# Patient Record
Sex: FEMALE | Race: WHITE | NOT HISPANIC OR LATINO | Employment: FULL TIME | URBAN - METROPOLITAN AREA
[De-identification: names, ages, dates, MRNs, and addresses within clinical notes are randomized per-mention and may not be internally consistent; named-entity substitution may affect disease eponyms.]

---

## 2017-02-22 ENCOUNTER — TRANSCRIBE ORDERS (OUTPATIENT)
Dept: INTERNAL MEDICINE | Facility: CLINIC | Age: 67
End: 2017-02-22

## 2017-02-22 DIAGNOSIS — Z13.9 SCREENING: Primary | ICD-10-CM

## 2017-03-01 ENCOUNTER — HOSPITAL ENCOUNTER (OUTPATIENT)
Dept: MAMMOGRAPHY | Facility: HOSPITAL | Age: 67
Discharge: HOME | End: 2017-03-01
Admitting: INTERNAL MEDICINE

## 2017-03-01 DIAGNOSIS — Z13.9 SCREENING: ICD-10-CM

## 2017-03-01 PROCEDURE — 77063 BREAST TOMOSYNTHESIS BI: CPT

## 2017-03-01 PROCEDURE — G0202 SCR MAMMO BI INCL CAD: HCPCS

## 2017-03-02 ENCOUNTER — TELEPHONE (OUTPATIENT)
Dept: INTERNAL MEDICINE | Facility: CLINIC | Age: 67
End: 2017-03-02

## 2017-04-03 DIAGNOSIS — M81.0 OSTEOPOROSIS: ICD-10-CM

## 2017-04-03 RX ORDER — ALENDRONATE SODIUM 70 MG/1
TABLET ORAL
Qty: 4 TABLET | Refills: 11 | Status: SHIPPED | OUTPATIENT
Start: 2017-04-03 | End: 2017-06-21 | Stop reason: SDUPTHER

## 2017-06-21 ENCOUNTER — OFFICE VISIT (OUTPATIENT)
Dept: INTERNAL MEDICINE | Facility: CLINIC | Age: 67
End: 2017-06-21

## 2017-06-21 VITALS
RESPIRATION RATE: 18 BRPM | BODY MASS INDEX: 23.32 KG/M2 | SYSTOLIC BLOOD PRESSURE: 110 MMHG | DIASTOLIC BLOOD PRESSURE: 64 MMHG | WEIGHT: 140 LBS | HEART RATE: 78 BPM | OXYGEN SATURATION: 98 % | HEIGHT: 65 IN

## 2017-06-21 DIAGNOSIS — M81.0 OSTEOPOROSIS: ICD-10-CM

## 2017-06-21 DIAGNOSIS — D72.9 WHITE BLOOD CELL ABNORMALITY: Primary | ICD-10-CM

## 2017-06-21 DIAGNOSIS — E55.9 VITAMIN D DEFICIENCY: ICD-10-CM

## 2017-06-21 LAB
25(OH)D3+25(OH)D2 SERPL-MCNC: 42.5 NG/ML
BASOPHILS # BLD AUTO: 0.03 10*3/MM3 (ref 0–0.2)
BASOPHILS NFR BLD AUTO: 0.6 % (ref 0–2)
BUN SERPL-MCNC: 19 MG/DL (ref 8–23)
BUN/CREAT SERPL: 27.9 (ref 7–25)
CALCIUM SERPL-MCNC: 9.4 MG/DL (ref 8.8–10.5)
CHLORIDE SERPL-SCNC: 101 MMOL/L (ref 98–107)
CO2 SERPL-SCNC: 28.8 MMOL/L (ref 22–29)
CREAT SERPL-MCNC: 0.68 MG/DL (ref 0.57–1)
EOSINOPHIL # BLD AUTO: 0.1 10*3/MM3 (ref 0.1–0.3)
EOSINOPHIL NFR BLD AUTO: 2 % (ref 0–4)
ERYTHROCYTE [DISTWIDTH] IN BLOOD BY AUTOMATED COUNT: 12.6 % (ref 11.5–14.5)
GLUCOSE SERPL-MCNC: 92 MG/DL (ref 65–99)
HCT VFR BLD AUTO: 37.7 % (ref 37–47)
HGB BLD-MCNC: 12.6 G/DL (ref 12–16)
IMM GRANULOCYTES # BLD: 0 10*3/MM3 (ref 0–0.03)
IMM GRANULOCYTES NFR BLD: 0 % (ref 0–0.5)
LYMPHOCYTES # BLD AUTO: 1.6 10*3/MM3 (ref 0.6–4.8)
LYMPHOCYTES NFR BLD AUTO: 31.4 % (ref 20–45)
MCH RBC QN AUTO: 31.1 PG (ref 27–31)
MCHC RBC AUTO-ENTMCNC: 33.4 G/DL (ref 31–37)
MCV RBC AUTO: 93.1 FL (ref 81–99)
MONOCYTES # BLD AUTO: 0.73 10*3/MM3 (ref 0–1)
MONOCYTES NFR BLD AUTO: 14.3 % (ref 3–8)
NEUTROPHILS # BLD AUTO: 2.64 10*3/MM3 (ref 1.5–8.3)
NEUTROPHILS NFR BLD AUTO: 51.7 % (ref 45–70)
NRBC BLD AUTO-RTO: 0 /100 WBC (ref 0–0)
PLATELET # BLD AUTO: 304 10*3/MM3 (ref 140–500)
POTASSIUM SERPL-SCNC: 4.3 MMOL/L (ref 3.5–5.2)
RBC # BLD AUTO: 4.05 10*6/MM3 (ref 4.2–5.4)
SODIUM SERPL-SCNC: 142 MMOL/L (ref 136–145)
WBC # BLD AUTO: 5.1 10*3/MM3 (ref 4.8–10.8)

## 2017-06-21 PROCEDURE — 99214 OFFICE O/P EST MOD 30 MIN: CPT | Performed by: INTERNAL MEDICINE

## 2017-06-21 RX ORDER — ALENDRONATE SODIUM 70 MG/1
70 TABLET ORAL
Qty: 12 TABLET | Refills: 3 | Status: SHIPPED | OUTPATIENT
Start: 2017-06-21 | End: 2018-06-08 | Stop reason: SDUPTHER

## 2017-06-21 RX ORDER — VITAMIN B COMPLEX
CAPSULE ORAL DAILY
COMMUNITY

## 2017-06-21 RX ORDER — MULTIVIT-MIN/IRON/FOLIC ACID/K 18-600-40
CAPSULE ORAL
COMMUNITY
End: 2019-09-18

## 2017-06-21 NOTE — PATIENT INSTRUCTIONS
Assessment/Plan   Isabel was seen today for osteoporosis.    Diagnoses and all orders for this visit:    White blood cell abnormality  -     CBC w AUTO Differential    Osteoporosis  -     alendronate (FOSAMAX) 70 MG tablet; Take 1 tablet by mouth Every 7 (Seven) Days.  -     Basic metabolic panel    Vitamin D deficiency  -     Vitamin D 25 hydroxy        Recheck DEXA in 2018, consider prolia or reclast at that time, but tolerating her fosamax well.

## 2017-07-11 ENCOUNTER — TELEPHONE (OUTPATIENT)
Dept: INTERNAL MEDICINE | Facility: CLINIC | Age: 67
End: 2017-07-11

## 2017-07-11 NOTE — TELEPHONE ENCOUNTER
Left message with results on patients voicemail.   ----- Message from Cuauhtemoc Carrasquillo MD sent at 7/11/2017  8:52 AM EDT -----  Labs are really good including vitamin D at 41. No changes based on labs.

## 2017-07-12 ENCOUNTER — TELEPHONE (OUTPATIENT)
Dept: INTERNAL MEDICINE | Facility: CLINIC | Age: 67
End: 2017-07-12

## 2017-07-12 NOTE — TELEPHONE ENCOUNTER
LVM with results on personal voicemail. Advised patient to call office with any questions or concerns.     ----- Message from Cuauhtemoc Carrasquillo MD sent at 7/11/2017  8:52 AM EDT -----  Labs are really good including vitamin D at 41. No changes based on labs.

## 2018-01-11 ENCOUNTER — LAB (OUTPATIENT)
Dept: INTERNAL MEDICINE | Facility: CLINIC | Age: 68
End: 2018-01-11

## 2018-01-11 DIAGNOSIS — D72.9 WHITE BLOOD CELL ABNORMALITY: ICD-10-CM

## 2018-01-11 DIAGNOSIS — E55.9 VITAMIN D DEFICIENCY: Primary | ICD-10-CM

## 2018-01-11 DIAGNOSIS — M81.0 OSTEOPOROSIS, UNSPECIFIED OSTEOPOROSIS TYPE, UNSPECIFIED PATHOLOGICAL FRACTURE PRESENCE: ICD-10-CM

## 2018-01-11 DIAGNOSIS — Z00.00 HEALTHCARE MAINTENANCE: ICD-10-CM

## 2018-01-11 LAB
25(OH)D3+25(OH)D2 SERPL-MCNC: 56.4 NG/ML
ALBUMIN SERPL-MCNC: 4.4 G/DL (ref 3.5–5.2)
ALBUMIN/GLOB SERPL: 2.2 G/DL
ALP SERPL-CCNC: 54 U/L (ref 40–129)
ALT SERPL-CCNC: 16 U/L (ref 5–33)
AST SERPL-CCNC: 21 U/L (ref 5–32)
BASOPHILS # BLD AUTO: 0.04 10*3/MM3 (ref 0–0.2)
BASOPHILS NFR BLD AUTO: 1.1 % (ref 0–2)
BILIRUB SERPL-MCNC: 0.4 MG/DL (ref 0.2–1.2)
BUN SERPL-MCNC: 14 MG/DL (ref 8–23)
BUN/CREAT SERPL: 17.7 (ref 7–25)
CALCIUM SERPL-MCNC: 9.4 MG/DL (ref 8.8–10.5)
CHLORIDE SERPL-SCNC: 101 MMOL/L (ref 98–107)
CHOLEST SERPL-MCNC: 230 MG/DL (ref 0–200)
CHOLEST/HDLC SERPL: 2.64 {RATIO}
CO2 SERPL-SCNC: 30.3 MMOL/L (ref 22–29)
CREAT SERPL-MCNC: 0.79 MG/DL (ref 0.57–1)
EOSINOPHIL # BLD AUTO: 0.08 10*3/MM3 (ref 0.1–0.3)
EOSINOPHIL NFR BLD AUTO: 2.2 % (ref 0–4)
ERYTHROCYTE [DISTWIDTH] IN BLOOD BY AUTOMATED COUNT: 12.4 % (ref 11.5–14.5)
GLOBULIN SER CALC-MCNC: 2 GM/DL
GLUCOSE SERPL-MCNC: 86 MG/DL (ref 65–99)
HBA1C MFR BLD: 5.3 % (ref 4.8–5.6)
HCT VFR BLD AUTO: 36.8 % (ref 37–47)
HDLC SERPL-MCNC: 87 MG/DL (ref 40–60)
HGB BLD-MCNC: 12.2 G/DL (ref 12–16)
IMM GRANULOCYTES # BLD: 0 10*3/MM3 (ref 0–0.03)
IMM GRANULOCYTES NFR BLD: 0 % (ref 0–0.5)
LDLC SERPL CALC-MCNC: 131 MG/DL (ref 0–100)
LYMPHOCYTES # BLD AUTO: 1.13 10*3/MM3 (ref 0.6–4.8)
LYMPHOCYTES NFR BLD AUTO: 31.4 % (ref 20–45)
MCH RBC QN AUTO: 31.7 PG (ref 27–31)
MCHC RBC AUTO-ENTMCNC: 33.2 G/DL (ref 31–37)
MCV RBC AUTO: 95.6 FL (ref 81–99)
MONOCYTES # BLD AUTO: 0.58 10*3/MM3 (ref 0–1)
MONOCYTES NFR BLD AUTO: 16.1 % (ref 3–8)
NEUTROPHILS # BLD AUTO: 1.77 10*3/MM3 (ref 1.5–8.3)
NEUTROPHILS NFR BLD AUTO: 49.2 % (ref 45–70)
NRBC BLD AUTO-RTO: 0 /100 WBC (ref 0–0)
PLATELET # BLD AUTO: 288 10*3/MM3 (ref 140–500)
POTASSIUM SERPL-SCNC: 4.1 MMOL/L (ref 3.5–5.2)
PROT SERPL-MCNC: 6.4 G/DL (ref 6–8.5)
RBC # BLD AUTO: 3.85 10*6/MM3 (ref 4.2–5.4)
SODIUM SERPL-SCNC: 141 MMOL/L (ref 136–145)
TRIGL SERPL-MCNC: 60 MG/DL (ref 0–150)
TSH SERPL DL<=0.005 MIU/L-ACNC: 2.97 MIU/ML (ref 0.27–4.2)
VLDLC SERPL CALC-MCNC: 12 MG/DL (ref 7–27)
WBC # BLD AUTO: 3.6 10*3/MM3 (ref 4.8–10.8)

## 2018-02-16 ENCOUNTER — TRANSCRIBE ORDERS (OUTPATIENT)
Dept: ADMINISTRATIVE | Facility: HOSPITAL | Age: 68
End: 2018-02-16

## 2018-02-16 ENCOUNTER — OFFICE VISIT (OUTPATIENT)
Dept: INTERNAL MEDICINE | Facility: CLINIC | Age: 68
End: 2018-02-16

## 2018-02-16 VITALS
DIASTOLIC BLOOD PRESSURE: 68 MMHG | RESPIRATION RATE: 16 BRPM | SYSTOLIC BLOOD PRESSURE: 112 MMHG | WEIGHT: 152 LBS | OXYGEN SATURATION: 98 % | HEART RATE: 76 BPM | BODY MASS INDEX: 25.29 KG/M2

## 2018-02-16 DIAGNOSIS — Z00.00 ROUTINE HEALTH MAINTENANCE: Primary | ICD-10-CM

## 2018-02-16 DIAGNOSIS — Z12.39 SCREENING BREAST EXAMINATION: Primary | ICD-10-CM

## 2018-02-16 PROCEDURE — 90732 PPSV23 VACC 2 YRS+ SUBQ/IM: CPT | Performed by: INTERNAL MEDICINE

## 2018-02-16 PROCEDURE — 93000 ELECTROCARDIOGRAM COMPLETE: CPT | Performed by: INTERNAL MEDICINE

## 2018-02-16 PROCEDURE — 99397 PER PM REEVAL EST PAT 65+ YR: CPT | Performed by: INTERNAL MEDICINE

## 2018-02-16 PROCEDURE — G0009 ADMIN PNEUMOCOCCAL VACCINE: HCPCS | Performed by: INTERNAL MEDICINE

## 2018-02-16 NOTE — PATIENT INSTRUCTIONS
Assessment/Plan   Isabel was seen today for annual exam.    Diagnoses and all orders for this visit:    Routine health maintenance  -     ECG 12 Lead      Recommend glenys and epley  Discussed start claritin D - 10 mg po daily    Knee chondromalacia, arthritis likely starting  Consider motrin if pain starts  Start quad strengthening     Consider fish oil.   Joint juice    Stop vitamin D  Continue caltrate      Return in about 6 months (around 8/16/2018).    Cuauhtemoc Carrasquillo MD  02/16/2018

## 2018-02-16 NOTE — PROGRESS NOTES
Subjective     Isabel Pizarro is a 67 y.o. female, who presents with a chief complaint of   Chief Complaint   Patient presents with   • Annual Exam       HPI       66 yo female with pmhx osteoporosis.  Tolerating fosamax religioiusly,  Without reflux.       Fighting cold for 2 weeks, having mild positional vertigo.  It is scant and positional.  No falls or ataxia.  She is feeling much better    She does know she has gained weight.         The following portions of the patient's history were reviewed and updated as appropriate: allergies, current medications, past family history, past medical history, past social history, past surgical history and problem list.    Allergies: Review of patient's allergies indicates no known allergies.    Current Outpatient Prescriptions:   •  alendronate (FOSAMAX) 70 MG tablet, Take 1 tablet by mouth Every 7 (Seven) Days., Disp: 12 tablet, Rfl: 3  •  B Complex Vitamins (VITAMIN B COMPLEX) capsule capsule, Take  by mouth Daily., Disp: , Rfl:   •  Cholecalciferol (VITAMIN D) 2000 UNITS capsule, Take  by mouth., Disp: , Rfl:   •  famciclovir (FAMVIR) 500 MG tablet, Take 1 tablet by mouth 3 (Three) Times a Day., Disp: 30 tablet, Rfl: 0  There are no discontinued medications.    Review of Systems   Constitutional: Negative.  Negative for appetite change, fatigue, fever and unexpected weight change.   HENT: Negative.  Negative for sinus pressure and trouble swallowing.    Eyes: Negative.    Respiratory: Negative.  Negative for cough and chest tightness.    Cardiovascular: Negative for chest pain, palpitations and leg swelling.   Gastrointestinal: Negative for constipation and diarrhea.   Genitourinary: Negative.  Negative for dysuria, frequency, hematuria, pelvic pain and vaginal discharge.   Musculoskeletal: Negative.    Skin: Negative.    Allergic/Immunologic: Negative.    Neurological: Negative.  Negative for dizziness, light-headedness and headaches.   Hematological: Negative.     Psychiatric/Behavioral: Negative.    All other systems reviewed and are negative.      Objective     /68  Pulse 76  Resp 16  Wt 68.9 kg (152 lb)  SpO2 98%  BMI 25.29 kg/m2      Physical Exam   Constitutional: She is oriented to person, place, and time. She appears well-developed and well-nourished.   HENT:   Head: Normocephalic and atraumatic.   Right Ear: External ear normal.   Left Ear: External ear normal.   Eyes: EOM are normal. Pupils are equal, round, and reactive to light.   Neck: Normal range of motion. Neck supple. No JVD present. No tracheal deviation present. No thyromegaly present.   Cardiovascular: Normal rate, regular rhythm and normal heart sounds.  Exam reveals no friction rub.    No murmur heard.  Pulmonary/Chest: Effort normal and breath sounds normal. No respiratory distress. She exhibits no mass. Right breast exhibits no inverted nipple, no mass, no nipple discharge, no skin change and no tenderness. Left breast exhibits no inverted nipple, no mass, no nipple discharge, no skin change and no tenderness. There is no breast swelling.   Lymphadenopathy:     She has no cervical adenopathy.   Neurological: She is alert and oriented to person, place, and time.   Skin: Skin is warm and dry.   Psychiatric: She has a normal mood and affect. Her behavior is normal.   Vitals reviewed.      Lab Results (most recent)     None          Results for orders placed or performed in visit on 01/11/18   Comprehensive Metabolic Panel   Result Value Ref Range    Glucose 86 65 - 99 mg/dL    BUN 14 8 - 23 mg/dL    Creatinine 0.79 0.57 - 1.00 mg/dL    eGFR Non African Am 73 >60 mL/min/1.73    eGFR African Am 88 >60 mL/min/1.73    BUN/Creatinine Ratio 17.7 7.0 - 25.0    Sodium 141 136 - 145 mmol/L    Potassium 4.1 3.5 - 5.2 mmol/L    Chloride 101 98 - 107 mmol/L    Total CO2 30.3 (H) 22.0 - 29.0 mmol/L    Calcium 9.4 8.8 - 10.5 mg/dL    Total Protein 6.4 6.0 - 8.5 g/dL    Albumin 4.40 3.50 - 5.20 g/dL     Globulin 2.0 gm/dL    A/G Ratio 2.2 g/dL    Total Bilirubin 0.4 0.2 - 1.2 mg/dL    Alkaline Phosphatase 54 40 - 129 U/L    AST (SGOT) 21 5 - 32 U/L    ALT (SGPT) 16 5 - 33 U/L   Lipid Panel With / Chol / HDL Ratio   Result Value Ref Range    Total Cholesterol 230 (H) 0 - 200 mg/dL    Triglycerides 60 0 - 150 mg/dL    HDL Cholesterol 87 (H) 40 - 60 mg/dL    VLDL Cholesterol 12 7 - 27 mg/dL    LDL Cholesterol  131 (H) 0 - 100 mg/dL    Chol/HDL Ratio 2.64    TSH   Result Value Ref Range    TSH 2.970 0.270 - 4.200 mIU/mL   Hemoglobin A1c   Result Value Ref Range    Hemoglobin A1C 5.30 4.80 - 5.60 %   Vitamin D 25 Hydroxy   Result Value Ref Range    25 Hydroxy, Vitamin D 56.4 ng/mL   CBC & Differential   Result Value Ref Range    WBC 3.60 (L) 4.80 - 10.80 10*3/mm3    RBC 3.85 (L) 4.20 - 5.40 10*6/mm3    Hemoglobin 12.2 12.0 - 16.0 g/dL    Hematocrit 36.8 (L) 37.0 - 47.0 %    MCV 95.6 81.0 - 99.0 fL    MCH 31.7 (H) 27.0 - 31.0 pg    MCHC 33.2 31.0 - 37.0 g/dL    RDW 12.4 11.5 - 14.5 %    Platelets 288 140 - 500 10*3/mm3    Neutrophil Rel % 49.2 45.0 - 70.0 %    Lymphocyte Rel % 31.4 20.0 - 45.0 %    Monocyte Rel % 16.1 (H) 3.0 - 8.0 %    Eosinophil Rel % 2.2 0.0 - 4.0 %    Basophil Rel % 1.1 0.0 - 2.0 %    Neutrophils Absolute 1.77 1.50 - 8.30 10*3/mm3    Lymphocytes Absolute 1.13 0.60 - 4.80 10*3/mm3    Monocytes Absolute 0.58 0.00 - 1.00 10*3/mm3    Eosinophils Absolute 0.08 (L) 0.10 - 0.30 10*3/mm3    Basophils Absolute 0.04 0.00 - 0.20 10*3/mm3    Immature Granulocyte Rel % 0.0 0.0 - 0.5 %    Immature Grans Absolute 0.00 0.00 - 0.03 10*3/mm3    nRBC 0.0 0.0 - 0.0 /100 WBC     Mammogram neg 3/16  DEXA 10/16 - osteoporosis      ECG 12 Lead  Date/Time: 2/16/2018 9:21 AM  Performed by: KATHE SALDANA  Authorized by: KATHE SALDANA   Comparison: not compared with previous ECG   Rhythm: sinus rhythm  Rate: normal  ST Segments: ST segments normal  T Waves: T waves normal  QRS axis: normal  Clinical impression: normal  ECG  Comments: EKG unchanged, compared with 12/2016                Assessment/Plan   Isabel was seen today for annual exam.    Diagnoses and all orders for this visit:    Routine health maintenance  -     ECG 12 Lead      Recommend glenys and epley  Discussed start claritin D - 10 mg po daily    Knee chondromalacia, arthritis likely starting  Consider motrin if pain starts  Start quad strengthening     Consider fish oil.   Joint juice    Stop vitamin D  Continue caltrate      Return in about 6 months (around 8/16/2018).    Cuauhtemoc Carrasquillo MD  02/16/2018

## 2018-03-05 ENCOUNTER — APPOINTMENT (OUTPATIENT)
Dept: MAMMOGRAPHY | Facility: HOSPITAL | Age: 68
End: 2018-03-05
Attending: INTERNAL MEDICINE

## 2018-03-06 ENCOUNTER — APPOINTMENT (OUTPATIENT)
Dept: MAMMOGRAPHY | Facility: HOSPITAL | Age: 68
End: 2018-03-06
Attending: INTERNAL MEDICINE

## 2018-03-09 ENCOUNTER — HOSPITAL ENCOUNTER (OUTPATIENT)
Dept: MAMMOGRAPHY | Facility: HOSPITAL | Age: 68
Discharge: HOME | End: 2018-03-09
Attending: INTERNAL MEDICINE | Admitting: INTERNAL MEDICINE

## 2018-03-09 DIAGNOSIS — Z12.39 SCREENING BREAST EXAMINATION: ICD-10-CM

## 2018-03-09 PROCEDURE — 77063 BREAST TOMOSYNTHESIS BI: CPT

## 2018-03-09 PROCEDURE — 77067 SCR MAMMO BI INCL CAD: CPT

## 2018-03-13 ENCOUNTER — TELEPHONE (OUTPATIENT)
Dept: INTERNAL MEDICINE | Facility: CLINIC | Age: 68
End: 2018-03-13

## 2018-03-13 NOTE — TELEPHONE ENCOUNTER
LVM with results on personal mailbox. Advised patient to return call with any questions or concerns.     ----- Message from Cuauhtemoc Carrasquillo MD sent at 3/13/2018  9:54 AM EDT -----  Normal mammogram

## 2018-06-08 DIAGNOSIS — M81.0 OSTEOPOROSIS: ICD-10-CM

## 2018-06-11 RX ORDER — ALENDRONATE SODIUM 70 MG/1
TABLET ORAL
Qty: 12 TABLET | Refills: 2 | Status: SHIPPED | OUTPATIENT
Start: 2018-06-11 | End: 2019-02-21 | Stop reason: SDUPTHER

## 2018-08-24 ENCOUNTER — OFFICE VISIT (OUTPATIENT)
Dept: INTERNAL MEDICINE | Facility: CLINIC | Age: 68
End: 2018-08-24

## 2018-08-24 VITALS
HEART RATE: 64 BPM | RESPIRATION RATE: 16 BRPM | DIASTOLIC BLOOD PRESSURE: 64 MMHG | WEIGHT: 145.4 LBS | OXYGEN SATURATION: 98 % | SYSTOLIC BLOOD PRESSURE: 120 MMHG | HEIGHT: 65 IN | TEMPERATURE: 97.8 F | BODY MASS INDEX: 24.22 KG/M2

## 2018-08-24 DIAGNOSIS — R31.21 ASYMPTOMATIC MICROSCOPIC HEMATURIA: ICD-10-CM

## 2018-08-24 DIAGNOSIS — R31.9 HEMATURIA, UNSPECIFIED TYPE: ICD-10-CM

## 2018-08-24 DIAGNOSIS — M81.0 OSTEOPOROSIS, UNSPECIFIED OSTEOPOROSIS TYPE, UNSPECIFIED PATHOLOGICAL FRACTURE PRESENCE: Primary | ICD-10-CM

## 2018-08-24 DIAGNOSIS — K63.5 HYPERPLASTIC COLONIC POLYP, UNSPECIFIED PART OF COLON: ICD-10-CM

## 2018-08-24 LAB
APPEARANCE UR: CLEAR
BACTERIA #/AREA URNS HPF: ABNORMAL /HPF
BILIRUB BLD-MCNC: NEGATIVE MG/DL
BILIRUB UR QL STRIP: NEGATIVE
CLARITY, POC: CLEAR
COLOR UR: (no result)
COLOR UR: YELLOW
EPI CELLS #/AREA URNS HPF: ABNORMAL /HPF
GLUCOSE UR QL: NEGATIVE
GLUCOSE UR STRIP-MCNC: NEGATIVE MG/DL
HGB UR QL STRIP: (no result)
KETONES UR QL STRIP: NEGATIVE
KETONES UR QL: NEGATIVE
LEUKOCYTE EST, POC: NEGATIVE
LEUKOCYTE ESTERASE UR QL STRIP: NEGATIVE
NITRITE UR QL STRIP: NEGATIVE
NITRITE UR-MCNC: NEGATIVE MG/ML
PH UR STRIP: 7.5 [PH] (ref 4.5–8)
PH UR: 7.5 [PH] (ref 5–8)
PROT UR QL STRIP: NEGATIVE
PROT UR STRIP-MCNC: NEGATIVE MG/DL
RBC # UR STRIP: ABNORMAL /UL
RBC #/AREA URNS HPF: ABNORMAL /HPF
SP GR UR: 1.01 (ref 1–1.03)
SP GR UR: 1.01 (ref 1–1.03)
UROBILINOGEN UR QL: NORMAL
UROBILINOGEN UR STRIP-MCNC: (no result) MG/DL
WBC #/AREA URNS HPF: ABNORMAL /HPF

## 2018-08-24 PROCEDURE — 81002 URINALYSIS NONAUTO W/O SCOPE: CPT | Performed by: INTERNAL MEDICINE

## 2018-08-24 PROCEDURE — 99214 OFFICE O/P EST MOD 30 MIN: CPT | Performed by: INTERNAL MEDICINE

## 2018-08-24 RX ORDER — CHLORAL HYDRATE 500 MG
CAPSULE ORAL
COMMUNITY

## 2018-08-24 NOTE — PATIENT INSTRUCTIONS
Isabel was seen today for osteoporosis.    Diagnoses and all orders for this visit:    Osteoporosis, unspecified osteoporosis type, unspecified pathological fracture presence    Asymptomatic microscopic hematuria    Hyperplastic colonic polyp, unspecified part of colon  -     Ambulatory Referral to Gastroenterology      Mammogram due after 3/9/18  DExA due  Consider prolia if T score decreased  Prior polyps.  Intermittent hematuria.  Topical itraconazole - compound care   Take B complex - hair skin and nails -

## 2018-08-24 NOTE — PROGRESS NOTES
"      Isabel Pizarro is a 68 y.o. female, who presents with a chief complaint of   Chief Complaint   Patient presents with   • Osteoporosis       HPI   69 yo female with osteoporosis doing great on fosamax with no ASAEL symptoms.  She reports no new concerns. Had history of hematuria, none recently, but due for check. She also has bilateral thickened nails on first and second toes.  Really not interested in oral therapy with hepatic risk.     The following portions of the patient's history were reviewed and updated as appropriate: allergies, current medications, past family history, past medical history, past social history, past surgical history and problem list.    Allergies: Patient has no known allergies.    Current Outpatient Prescriptions:   •  Omega-3 Fatty Acids (FISH OIL) 1000 MG capsule capsule, Take  by mouth Daily With Breakfast., Disp: , Rfl:   •  alendronate (FOSAMAX) 70 MG tablet, TAKE 1 TABLET BY MOUTH ONCE WEEKLY BEFORE BREAKFAST, ON AN EMPTY STOMACH: REMAIN UPRIGHT FOR 30 MINUTES:TAKE WITH 8 OUNCES OF WATER, Disp: 12 tablet, Rfl: 2  •  B Complex Vitamins (VITAMIN B COMPLEX) capsule capsule, Take  by mouth Daily., Disp: , Rfl:   •  Cholecalciferol (VITAMIN D) 2000 UNITS capsule, Take  by mouth., Disp: , Rfl:   Medications Discontinued During This Encounter   Medication Reason   • famciclovir (FAMVIR) 500 MG tablet *Therapy completed       Review of Systems   Constitutional: Negative.    HENT: Negative.    Respiratory: Negative.    Cardiovascular: Negative.    Endocrine:        As in hpi   Genitourinary: Negative.    Neurological: Negative.    Hematological: Negative.    Psychiatric/Behavioral: Negative.              /64   Pulse 64   Temp 97.8 °F (36.6 °C)   Resp 16   Ht 165.1 cm (65\")   Wt 66 kg (145 lb 6.4 oz)   SpO2 98%   BMI 24.20 kg/m²       Physical Exam   Constitutional: She is oriented to person, place, and time. She appears well-developed and well-nourished.   HENT:   Head: " Normocephalic and atraumatic.   Right Ear: External ear normal.   Left Ear: External ear normal.   Eyes: Pupils are equal, round, and reactive to light. EOM are normal.   Neck: Normal range of motion. Neck supple. No thyromegaly present.   Cardiovascular: Normal rate, regular rhythm and normal heart sounds.    No murmur heard.  Pulmonary/Chest: Effort normal and breath sounds normal.   Musculoskeletal: She exhibits no edema.   Neurological: She is alert and oriented to person, place, and time.   Skin: Skin is warm and dry. Capillary refill takes less than 2 seconds.   Psychiatric: She has a normal mood and affect. Her behavior is normal.   Vitals reviewed.      Lab Results (most recent)     None          Results for orders placed or performed in visit on 01/11/18   Comprehensive Metabolic Panel   Result Value Ref Range    Glucose 86 65 - 99 mg/dL    BUN 14 8 - 23 mg/dL    Creatinine 0.79 0.57 - 1.00 mg/dL    eGFR Non African Am 73 >60 mL/min/1.73    eGFR African Am 88 >60 mL/min/1.73    BUN/Creatinine Ratio 17.7 7.0 - 25.0    Sodium 141 136 - 145 mmol/L    Potassium 4.1 3.5 - 5.2 mmol/L    Chloride 101 98 - 107 mmol/L    Total CO2 30.3 (H) 22.0 - 29.0 mmol/L    Calcium 9.4 8.8 - 10.5 mg/dL    Total Protein 6.4 6.0 - 8.5 g/dL    Albumin 4.40 3.50 - 5.20 g/dL    Globulin 2.0 gm/dL    A/G Ratio 2.2 g/dL    Total Bilirubin 0.4 0.2 - 1.2 mg/dL    Alkaline Phosphatase 54 40 - 129 U/L    AST (SGOT) 21 5 - 32 U/L    ALT (SGPT) 16 5 - 33 U/L   Lipid Panel With / Chol / HDL Ratio   Result Value Ref Range    Total Cholesterol 230 (H) 0 - 200 mg/dL    Triglycerides 60 0 - 150 mg/dL    HDL Cholesterol 87 (H) 40 - 60 mg/dL    VLDL Cholesterol 12 7 - 27 mg/dL    LDL Cholesterol  131 (H) 0 - 100 mg/dL    Chol/HDL Ratio 2.64    TSH   Result Value Ref Range    TSH 2.970 0.270 - 4.200 mIU/mL   Hemoglobin A1c   Result Value Ref Range    Hemoglobin A1C 5.30 4.80 - 5.60 %   Vitamin D 25 Hydroxy   Result Value Ref Range    25 Hydroxy,  Vitamin D 56.4 ng/mL   CBC & Differential   Result Value Ref Range    WBC 3.60 (L) 4.80 - 10.80 10*3/mm3    RBC 3.85 (L) 4.20 - 5.40 10*6/mm3    Hemoglobin 12.2 12.0 - 16.0 g/dL    Hematocrit 36.8 (L) 37.0 - 47.0 %    MCV 95.6 81.0 - 99.0 fL    MCH 31.7 (H) 27.0 - 31.0 pg    MCHC 33.2 31.0 - 37.0 g/dL    RDW 12.4 11.5 - 14.5 %    Platelets 288 140 - 500 10*3/mm3    Neutrophil Rel % 49.2 45.0 - 70.0 %    Lymphocyte Rel % 31.4 20.0 - 45.0 %    Monocyte Rel % 16.1 (H) 3.0 - 8.0 %    Eosinophil Rel % 2.2 0.0 - 4.0 %    Basophil Rel % 1.1 0.0 - 2.0 %    Neutrophils Absolute 1.77 1.50 - 8.30 10*3/mm3    Lymphocytes Absolute 1.13 0.60 - 4.80 10*3/mm3    Monocytes Absolute 0.58 0.00 - 1.00 10*3/mm3    Eosinophils Absolute 0.08 (L) 0.10 - 0.30 10*3/mm3    Basophils Absolute 0.04 0.00 - 0.20 10*3/mm3    Immature Granulocyte Rel % 0.0 0.0 - 0.5 %    Immature Grans Absolute 0.00 0.00 - 0.03 10*3/mm3    nRBC 0.0 0.0 - 0.0 /100 WBC           Isabel was seen today for osteoporosis.    Diagnoses and all orders for this visit:    Osteoporosis, unspecified osteoporosis type, unspecified pathological fracture presence    Asymptomatic microscopic hematuria  -     POCT urinalysis dipstick, manual    Hyperplastic colonic polyp, unspecified part of colon  -     Ambulatory Referral to Gastroenterology      Mammogram due after 3/9/18  DExA due  Consider prolia if T score decreased  Prior polyps.  Intermittent hematuria.  Topical itraconazole - compound care   Take B complex - hair skin and nails -     Return in about 6 months (around 2/24/2019) for Annual physical.    Cuauhtemoc Carrasquillo MD  08/24/2018

## 2018-12-06 ENCOUNTER — TELEPHONE (OUTPATIENT)
Dept: GASTROENTEROLOGY | Facility: CLINIC | Age: 68
End: 2018-12-06

## 2018-12-09 ENCOUNTER — PREP FOR SURGERY (OUTPATIENT)
Dept: OTHER | Facility: HOSPITAL | Age: 68
End: 2018-12-09

## 2018-12-09 DIAGNOSIS — Z83.71 FAMILY HISTORY OF COLONIC POLYPS: Primary | ICD-10-CM

## 2019-02-04 ENCOUNTER — LAB REQUISITION (OUTPATIENT)
Dept: LAB | Facility: HOSPITAL | Age: 69
End: 2019-02-04

## 2019-02-04 ENCOUNTER — OUTSIDE FACILITY SERVICE (OUTPATIENT)
Dept: GASTROENTEROLOGY | Facility: CLINIC | Age: 69
End: 2019-02-04

## 2019-02-04 DIAGNOSIS — Z83.71 FAMILY HISTORY OF COLONIC POLYPS: ICD-10-CM

## 2019-02-04 PROCEDURE — 88305 TISSUE EXAM BY PATHOLOGIST: CPT | Performed by: INTERNAL MEDICINE

## 2019-02-04 PROCEDURE — 45380 COLONOSCOPY AND BIOPSY: CPT | Performed by: INTERNAL MEDICINE

## 2019-02-04 PROCEDURE — 45385 COLONOSCOPY W/LESION REMOVAL: CPT | Performed by: INTERNAL MEDICINE

## 2019-02-06 LAB
CYTO UR: NORMAL
LAB AP CASE REPORT: NORMAL
LAB AP CLINICAL INFORMATION: NORMAL
PATH REPORT.FINAL DX SPEC: NORMAL
PATH REPORT.GROSS SPEC: NORMAL

## 2019-02-12 ENCOUNTER — TRANSCRIBE ORDERS (OUTPATIENT)
Dept: ADMINISTRATIVE | Facility: HOSPITAL | Age: 69
End: 2019-02-12

## 2019-02-12 DIAGNOSIS — Z12.39 SCREENING BREAST EXAMINATION: Primary | ICD-10-CM

## 2019-02-14 ENCOUNTER — LAB (OUTPATIENT)
Dept: INTERNAL MEDICINE | Facility: CLINIC | Age: 69
End: 2019-02-14

## 2019-02-14 DIAGNOSIS — Z00.00 ROUTINE HEALTH MAINTENANCE: Primary | ICD-10-CM

## 2019-02-15 LAB
25(OH)D3+25(OH)D2 SERPL-MCNC: 51.4 NG/ML
ALBUMIN SERPL-MCNC: 4.6 G/DL (ref 3.5–5.2)
ALBUMIN/GLOB SERPL: 2 G/DL
ALP SERPL-CCNC: 52 U/L (ref 40–129)
ALT SERPL-CCNC: 12 U/L (ref 5–33)
APPEARANCE UR: CLEAR
AST SERPL-CCNC: 17 U/L (ref 5–32)
BACTERIA #/AREA URNS HPF: NORMAL /HPF
BASOPHILS # BLD AUTO: 0.02 10*3/MM3 (ref 0–0.2)
BASOPHILS NFR BLD AUTO: 0.5 % (ref 0–1.5)
BILIRUB SERPL-MCNC: 0.4 MG/DL (ref 0.2–1.2)
BILIRUB UR QL STRIP: NEGATIVE
BUN SERPL-MCNC: 15 MG/DL (ref 8–23)
BUN/CREAT SERPL: 18.8 (ref 7–25)
CALCIUM SERPL-MCNC: 9.5 MG/DL (ref 8.8–10.5)
CHLORIDE SERPL-SCNC: 102 MMOL/L (ref 98–107)
CO2 SERPL-SCNC: 30.2 MMOL/L (ref 22–29)
COLOR UR: YELLOW
CREAT SERPL-MCNC: 0.8 MG/DL (ref 0.57–1)
EOSINOPHIL # BLD AUTO: 0.09 10*3/MM3 (ref 0–0.4)
EOSINOPHIL NFR BLD AUTO: 2.2 % (ref 0.3–6.2)
EPI CELLS #/AREA URNS HPF: NORMAL /HPF
ERYTHROCYTE [DISTWIDTH] IN BLOOD BY AUTOMATED COUNT: 12 % (ref 12.3–15.4)
GLOBULIN SER CALC-MCNC: 2.3 GM/DL
GLUCOSE SERPL-MCNC: 94 MG/DL (ref 65–99)
GLUCOSE UR QL: NEGATIVE
HBA1C MFR BLD: 5.2 % (ref 4.8–5.6)
HCT VFR BLD AUTO: 38.8 % (ref 34–46.6)
HGB BLD-MCNC: 12.9 G/DL (ref 12–15.9)
HGB UR QL STRIP: ABNORMAL
IMM GRANULOCYTES # BLD AUTO: 0.01 10*3/MM3 (ref 0–0.05)
IMM GRANULOCYTES NFR BLD AUTO: 0.2 % (ref 0–0.5)
KETONES UR QL STRIP: NEGATIVE
LEUKOCYTE ESTERASE UR QL STRIP: NEGATIVE
LYMPHOCYTES # BLD AUTO: 1.43 10*3/MM3 (ref 0.7–3.1)
LYMPHOCYTES NFR BLD AUTO: 35.2 % (ref 19.6–45.3)
MCH RBC QN AUTO: 32.1 PG (ref 26.6–33)
MCHC RBC AUTO-ENTMCNC: 33.2 G/DL (ref 31.5–35.7)
MCV RBC AUTO: 96.5 FL (ref 79–97)
MICRO URNS: ABNORMAL
MONOCYTES # BLD AUTO: 0.6 10*3/MM3 (ref 0.1–0.9)
MONOCYTES NFR BLD AUTO: 14.8 % (ref 5–12)
MUCOUS THREADS URNS QL MICRO: PRESENT /HPF
NEUTROPHILS # BLD AUTO: 1.91 10*3/MM3 (ref 1.4–7)
NEUTROPHILS NFR BLD AUTO: 47.1 % (ref 42.7–76)
NITRITE UR QL STRIP: NEGATIVE
NRBC BLD AUTO-RTO: 0 /100 WBC (ref 0–0)
PH UR STRIP: 7 [PH] (ref 5–7.5)
PLATELET # BLD AUTO: 286 10*3/MM3 (ref 140–450)
POTASSIUM SERPL-SCNC: 4.1 MMOL/L (ref 3.5–5.2)
PROT SERPL-MCNC: 6.9 G/DL (ref 6–8.5)
PROT UR QL STRIP: NEGATIVE
RBC # BLD AUTO: 4.02 10*6/MM3 (ref 3.77–5.28)
RBC #/AREA URNS HPF: NORMAL /HPF
SODIUM SERPL-SCNC: 142 MMOL/L (ref 136–145)
SP GR UR: 1.01 (ref 1–1.03)
TSH SERPL DL<=0.005 MIU/L-ACNC: 2.14 MIU/ML (ref 0.27–4.2)
URINALYSIS REFLEX: ABNORMAL
UROBILINOGEN UR STRIP-MCNC: 0.2 MG/DL (ref 0.2–1)
WBC # BLD AUTO: 4.06 10*3/MM3 (ref 3.4–10.8)
WBC #/AREA URNS HPF: NORMAL /HPF

## 2019-02-21 DIAGNOSIS — M81.0 OSTEOPOROSIS: ICD-10-CM

## 2019-02-21 RX ORDER — ALENDRONATE SODIUM 70 MG/1
TABLET ORAL
Qty: 12 TABLET | Refills: 1 | Status: SHIPPED | OUTPATIENT
Start: 2019-02-21 | End: 2019-08-23 | Stop reason: SDUPTHER

## 2019-02-22 ENCOUNTER — OFFICE VISIT (OUTPATIENT)
Dept: INTERNAL MEDICINE | Facility: CLINIC | Age: 69
End: 2019-02-22

## 2019-02-22 VITALS
HEART RATE: 70 BPM | DIASTOLIC BLOOD PRESSURE: 68 MMHG | WEIGHT: 146.8 LBS | HEIGHT: 65 IN | SYSTOLIC BLOOD PRESSURE: 126 MMHG | BODY MASS INDEX: 24.46 KG/M2 | OXYGEN SATURATION: 98 % | RESPIRATION RATE: 16 BRPM

## 2019-02-22 DIAGNOSIS — M81.0 OSTEOPOROSIS, UNSPECIFIED OSTEOPOROSIS TYPE, UNSPECIFIED PATHOLOGICAL FRACTURE PRESENCE: ICD-10-CM

## 2019-02-22 DIAGNOSIS — Z78.0 MENOPAUSE: Primary | ICD-10-CM

## 2019-02-22 DIAGNOSIS — Z00.00 ROUTINE HEALTH MAINTENANCE: Primary | ICD-10-CM

## 2019-02-22 PROCEDURE — 99214 OFFICE O/P EST MOD 30 MIN: CPT | Performed by: INTERNAL MEDICINE

## 2019-02-22 NOTE — PROGRESS NOTES
Isabel Pizarro is a 68 y.o. female, who presents with a chief complaint of   Chief Complaint   Patient presents with   • Annual Exam       HPI     67 yo female with pmhx osteoporosis here for routine health exam. She is doing very well. She is walking regularly. She is doing 2 days per week.   Continues to work 7 hours per day    She does worry about her mothers Alzheimers dementia. She is forgetful regarding names.  Mother was 85 years at her onset.      The following portions of the patient's history were reviewed and updated as appropriate: allergies, current medications, past family history, past medical history, past social history, past surgical history and problem list.    Allergies: Patient has no known allergies.    Current Outpatient Medications:   •  alendronate (FOSAMAX) 70 MG tablet, TAKE 1 TABLET BY MOUTH ONCE WEEKLY BEFORE BREAKFAST, ON AN EMPTY STOMACH: REMAIN UPRIGHT FOR 30 MINUTES:TAKE WITH 8 OUNCES OF WATER, Disp: 12 tablet, Rfl: 1  •  B Complex Vitamins (VITAMIN B COMPLEX) capsule capsule, Take  by mouth Daily., Disp: , Rfl:   •  Cholecalciferol (VITAMIN D) 2000 UNITS capsule, Take  by mouth., Disp: , Rfl:   •  Omega-3 Fatty Acids (FISH OIL) 1000 MG capsule capsule, Take  by mouth Daily With Breakfast., Disp: , Rfl:   There are no discontinued medications.    Review of Systems   Constitutional: Negative.  Negative for appetite change, fatigue, fever and unexpected weight change.   HENT: Negative.  Negative for sinus pressure and trouble swallowing.    Respiratory: Negative.  Negative for cough and chest tightness.    Cardiovascular: Negative.  Negative for chest pain, palpitations and leg swelling.   Gastrointestinal: Negative.  Negative for constipation and diarrhea.   Genitourinary: Negative.  Negative for dysuria, frequency, hematuria, pelvic pain and vaginal discharge.   Musculoskeletal: Negative.    Skin: Negative.    Neurological: Negative.  Negative for dizziness, light-headedness  "and headaches.   Hematological: Negative.    Psychiatric/Behavioral: Negative.    All other systems reviewed and are negative.            /68   Pulse 70   Resp 16   Ht 165.1 cm (65\")   Wt 66.6 kg (146 lb 12.8 oz)   SpO2 98%   BMI 24.43 kg/m²       Physical Exam   Constitutional: She is oriented to person, place, and time. She appears well-developed and well-nourished.   HENT:   Head: Normocephalic and atraumatic.   Right Ear: External ear normal.   Left Ear: External ear normal.   Mouth/Throat: Oropharynx is clear and moist. No oropharyngeal exudate.   Eyes: EOM are normal. Pupils are equal, round, and reactive to light.   Neck: Normal range of motion. Neck supple. No tracheal deviation present. No thyromegaly present.   Cardiovascular: Normal rate, regular rhythm and normal heart sounds. Exam reveals no friction rub.   No murmur heard.  Pulmonary/Chest: Effort normal and breath sounds normal.   Abdominal: Soft.   Musculoskeletal: She exhibits no edema.   Neurological: She is alert and oriented to person, place, and time.   Skin: Skin is warm and dry. Capillary refill takes less than 2 seconds.   Psychiatric: She has a normal mood and affect. Her behavior is normal.   Vitals reviewed.      Lab Results (most recent)     None          Results for orders placed or performed in visit on 02/14/19   Vitamin D 25 Hydroxy   Result Value Ref Range    25 Hydroxy, Vitamin D 51.4 ng/ml   TSH   Result Value Ref Range    TSH 2.140 0.270 - 4.200 mIU/mL   Urinalysis With Culture If Indicated - Urine, Clean Catch   Result Value Ref Range    Specific Gravity, UA 1.012 1.005 - 1.030    pH, UA 7.0 5.0 - 7.5    Color, UA Yellow Yellow    Appearance, UA Clear Clear    Leukocytes, UA Negative Negative    Protein Negative Negative/Trace    Glucose, UA Negative Negative    Ketones Negative Negative    Blood, UA Trace (A) Negative    Bilirubin, UA Negative Negative    Urobilinogen, UA 0.2 0.2 - 1.0 mg/dL    Nitrite, UA Negative " Negative    Microscopic Examination See below:     Urinalysis Reflex Comment    Comprehensive Metabolic Panel   Result Value Ref Range    Glucose 94 65 - 99 mg/dL    BUN 15 8 - 23 mg/dL    Creatinine 0.80 0.57 - 1.00 mg/dL    eGFR Non African Am 72 >60 mL/min/1.73    eGFR African Am 87 >60 mL/min/1.73    BUN/Creatinine Ratio 18.8 7.0 - 25.0    Sodium 142 136 - 145 mmol/L    Potassium 4.1 3.5 - 5.2 mmol/L    Chloride 102 98 - 107 mmol/L    Total CO2 30.2 (H) 22.0 - 29.0 mmol/L    Calcium 9.5 8.8 - 10.5 mg/dL    Total Protein 6.9 6.0 - 8.5 g/dL    Albumin 4.60 3.50 - 5.20 g/dL    Globulin 2.3 gm/dL    A/G Ratio 2.0 g/dL    Total Bilirubin 0.4 0.2 - 1.2 mg/dL    Alkaline Phosphatase 52 40 - 129 U/L    AST (SGOT) 17 5 - 32 U/L    ALT (SGPT) 12 5 - 33 U/L   Hemoglobin A1c   Result Value Ref Range    Hemoglobin A1C 5.20 4.80 - 5.60 %   Microscopic Examination -   Result Value Ref Range    WBC, UA 0-5 0 - 5 /hpf    RBC, UA 0-2 0 - 2 /hpf    Epithelial Cells (non renal) None seen 0 - 10 /hpf    Mucus, UA Present Not Estab. /hpf    Bacteria, UA Few None seen/Few /hpf   CBC & Differential   Result Value Ref Range    WBC 4.06 3.40 - 10.80 10*3/mm3    RBC 4.02 3.77 - 5.28 10*6/mm3    Hemoglobin 12.9 12.0 - 15.9 g/dL    Hematocrit 38.8 34.0 - 46.6 %    MCV 96.5 79.0 - 97.0 fL    MCH 32.1 26.6 - 33.0 pg    MCHC 33.2 31.5 - 35.7 g/dL    RDW 12.0 (L) 12.3 - 15.4 %    Platelets 286 140 - 450 10*3/mm3    Neutrophil Rel % 47.1 42.7 - 76.0 %    Lymphocyte Rel % 35.2 19.6 - 45.3 %    Monocyte Rel % 14.8 (H) 5.0 - 12.0 %    Eosinophil Rel % 2.2 0.3 - 6.2 %    Basophil Rel % 0.5 0.0 - 1.5 %    Neutrophils Absolute 1.91 1.40 - 7.00 10*3/mm3    Lymphocytes Absolute 1.43 0.70 - 3.10 10*3/mm3    Monocytes Absolute 0.60 0.10 - 0.90 10*3/mm3    Eosinophils Absolute 0.09 0.00 - 0.40 10*3/mm3    Basophils Absolute 0.02 0.00 - 0.20 10*3/mm3    Immature Granulocyte Rel % 0.2 0.0 - 0.5 %    Immature Grans Absolute 0.01 0.00 - 0.05 10*3/mm3    nRBC  0.0 0.0 - 0.0 /100 WBC   3/9/2018 mammogram normal    UA trace blood      Isabel was seen today for annual exam.    Diagnoses and all orders for this visit:    Routine health maintenance    Osteoporosis, unspecified osteoporosis type, unspecified pathological fracture presence      [email or web address]  Consider crossword puzzles  Actual book - weekly goal  Vitamin E daily/ already on fish oil  Continue daily exercise    Shingrix recommended at local pharmacy  Hepatitis c and flp next visit    Return if symptoms worsen or fail to improve.    Cuauhtemoc Carrasquillo MD  02/22/2019

## 2019-02-22 NOTE — PATIENT INSTRUCTIONS
3/9/2018 mammogram normal    UA trace blood      Isabel was seen today for annual exam.    Diagnoses and all orders for this visit:    Routine health maintenance    Osteoporosis, unspecified osteoporosis type, unspecified pathological fracture presence      [email or web address]  Consider crossword puzzles  Actual book - weekly goal  Vitamin E daily/ already on fish oil  Continue daily exercise      Return if symptoms worsen or fail to improve.

## 2019-02-28 ENCOUNTER — HOSPITAL ENCOUNTER (OUTPATIENT)
Dept: BONE DENSITY | Facility: HOSPITAL | Age: 69
Discharge: HOME | End: 2019-02-28
Admitting: INTERNAL MEDICINE

## 2019-02-28 PROCEDURE — 77080 DXA BONE DENSITY AXIAL: CPT

## 2019-03-04 ENCOUNTER — TELEPHONE (OUTPATIENT)
Dept: INTERNAL MEDICINE | Facility: CLINIC | Age: 69
End: 2019-03-04

## 2019-03-04 NOTE — TELEPHONE ENCOUNTER
Left message below.  Call is questions      ----- Message from Cuauhtemoc Carrasquillo MD sent at 2/28/2019  3:04 PM EST -----  Does have osteoporosis.  Recommend prolia if she is willing to try with next provider or with me if she is transferring. If going to stay with [facility] will need to discuss with next physician.

## 2019-03-11 ENCOUNTER — HOSPITAL ENCOUNTER (OUTPATIENT)
Dept: MAMMOGRAPHY | Facility: HOSPITAL | Age: 69
Discharge: HOME | End: 2019-03-11
Admitting: INTERNAL MEDICINE

## 2019-03-11 DIAGNOSIS — Z12.39 SCREENING BREAST EXAMINATION: ICD-10-CM

## 2019-03-11 PROCEDURE — 77063 BREAST TOMOSYNTHESIS BI: CPT

## 2019-03-11 PROCEDURE — 77067 SCR MAMMO BI INCL CAD: CPT

## 2019-08-23 ENCOUNTER — TELEPHONE (OUTPATIENT)
Dept: INTERNAL MEDICINE | Facility: CLINIC | Age: 69
End: 2019-08-23

## 2019-08-23 DIAGNOSIS — M81.0 OSTEOPOROSIS: ICD-10-CM

## 2019-08-23 RX ORDER — ALENDRONATE SODIUM 70 MG/1
70 TABLET ORAL WEEKLY
Qty: 12 TABLET | Refills: 1 | Status: SHIPPED | OUTPATIENT
Start: 2019-08-23 | End: 2020-01-29 | Stop reason: SDUPTHER

## 2019-09-18 ENCOUNTER — OFFICE VISIT (OUTPATIENT)
Dept: INTERNAL MEDICINE | Facility: CLINIC | Age: 69
End: 2019-09-18

## 2019-09-18 VITALS
RESPIRATION RATE: 14 BRPM | BODY MASS INDEX: 24.66 KG/M2 | WEIGHT: 148 LBS | DIASTOLIC BLOOD PRESSURE: 78 MMHG | OXYGEN SATURATION: 100 % | HEIGHT: 65 IN | HEART RATE: 73 BPM | TEMPERATURE: 98 F | SYSTOLIC BLOOD PRESSURE: 134 MMHG

## 2019-09-18 DIAGNOSIS — I83.813 VARICOSE VEINS OF BOTH LOWER EXTREMITIES WITH PAIN: ICD-10-CM

## 2019-09-18 DIAGNOSIS — M81.0 OSTEOPOROSIS, UNSPECIFIED OSTEOPOROSIS TYPE, UNSPECIFIED PATHOLOGICAL FRACTURE PRESENCE: Primary | ICD-10-CM

## 2019-09-18 DIAGNOSIS — E55.9 VITAMIN D DEFICIENCY: ICD-10-CM

## 2019-09-18 PROBLEM — D72.9 WHITE BLOOD CELL ABNORMALITY: Status: RESOLVED | Noted: 2017-06-21 | Resolved: 2019-09-18

## 2019-09-18 PROBLEM — Z00.00 ROUTINE HEALTH MAINTENANCE: Status: RESOLVED | Noted: 2018-02-16 | Resolved: 2019-09-18

## 2019-09-18 PROBLEM — R31.21 ASYMPTOMATIC MICROSCOPIC HEMATURIA: Status: RESOLVED | Noted: 2018-08-24 | Resolved: 2019-09-18

## 2019-09-18 PROCEDURE — 90670 PCV13 VACCINE IM: CPT | Performed by: INTERNAL MEDICINE

## 2019-09-18 PROCEDURE — 99214 OFFICE O/P EST MOD 30 MIN: CPT | Performed by: INTERNAL MEDICINE

## 2019-09-18 PROCEDURE — 90471 IMMUNIZATION ADMIN: CPT | Performed by: INTERNAL MEDICINE

## 2019-09-18 RX ORDER — CA/D3/MAG OX/ZINC/COP/MANG/BOR 600 MG-800
TABLET,CHEWABLE ORAL
COMMUNITY

## 2019-09-18 NOTE — PROGRESS NOTES
Isabel Pizarro is a 69 y.o. female, who presents with a chief complaint of   Chief Complaint   Patient presents with   • Follow-up     [facility] transfer, 6 x month f/u    • Vitamin D Deficiency   • Osteoporosis       68 yo F here to establish care. All of her problems are new to me. She has been on  Fosamax for close to 5 years off and on for the last 15 years. She has tolerated well. Doesn't want to go on infusions at this point. DEXA scan borderline osteoporosis and stable since 2016. She is taking caltrate and doing yoga and walking a few times per week.     Her mammogram is up to date. She just had c-scope and had some polyps, repeat due in 2024. She is done having pap smears.     She is a vegetarian, but B12 has been normal.     She has varicose veins that bother her off and on particularly with standing. Compression stockings help. Hasn't seen surgeon. No edema regularly.          The following portions of the patient's history were reviewed and updated as appropriate: allergies, current medications, past family history, past medical history, past social history, past surgical history and problem list.    Allergies: Patient has no known allergies.    Current Outpatient Medications:   •  alendronate (FOSAMAX) 70 MG tablet, Take 1 tablet by mouth 1 (One) Time Per Week., Disp: 12 tablet, Rfl: 1  •  B Complex Vitamins (VITAMIN B COMPLEX) capsule capsule, Take  by mouth Daily., Disp: , Rfl:   •  Biotin 5000 MCG capsule, Take  by mouth., Disp: , Rfl:   •  Calcium Carbonate-Vit D-Min (CALTRATE 600+D PLUS MINERALS) 600-800 MG-UNIT chewable tablet, Chew., Disp: , Rfl:   •  Cyanocobalamin (VITAMIN B-12 PO), Take 3,000 mcg by mouth., Disp: , Rfl:   •  Omega-3 Fatty Acids (FISH OIL) 1000 MG capsule capsule, Take  by mouth Daily With Breakfast., Disp: , Rfl:   Medications Discontinued During This Encounter   Medication Reason   • Cholecalciferol (VITAMIN D) 2000 UNITS capsule *Therapy completed       Review of Systems  "  Constitutional: Negative for chills, fatigue and fever.   HENT: Negative for congestion and rhinorrhea.    Respiratory: Negative for cough and shortness of breath.    Cardiovascular: Negative for chest pain, palpitations and leg swelling.   Gastrointestinal: Negative for abdominal pain, constipation, diarrhea and nausea.   Musculoskeletal: Negative for myalgias.   Skin: Positive for color change (varicose veins ).   Hematological: Negative for adenopathy.             /78 (BP Location: Left arm, Patient Position: Sitting, Cuff Size: Adult)   Pulse 73   Temp 98 °F (36.7 °C) (Oral)   Resp 14   Ht 165.1 cm (65\")   Wt 67.1 kg (148 lb)   SpO2 100%   BMI 24.63 kg/m²       Physical Exam   Constitutional: She is oriented to person, place, and time. She appears well-developed and well-nourished. No distress.   HENT:   Head: Normocephalic and atraumatic.   Right Ear: External ear normal.   Left Ear: External ear normal.   Mouth/Throat: Oropharynx is clear and moist. No oropharyngeal exudate.   Eyes: Conjunctivae are normal. Right eye exhibits no discharge. Left eye exhibits no discharge. No scleral icterus.   Neck: Neck supple.   Cardiovascular: Normal rate, regular rhythm and normal heart sounds. Exam reveals no gallop and no friction rub.   No murmur heard.  Pulmonary/Chest: Effort normal and breath sounds normal. No respiratory distress. She has no wheezes. She has no rales.   Lymphadenopathy:     She has no cervical adenopathy.   Neurological: She is alert and oriented to person, place, and time. She exhibits normal muscle tone. Coordination normal.   Skin: Skin is warm. No rash noted.   superficial varicose veins bilaterally    Psychiatric: She has a normal mood and affect. Her behavior is normal.   Nursing note and vitals reviewed.        Results for orders placed or performed in visit on 02/14/19   Vitamin D 25 Hydroxy   Result Value Ref Range    25 Hydroxy, Vitamin D 51.4 ng/ml   TSH   Result Value Ref " Range    TSH 2.140 0.270 - 4.200 mIU/mL   Urinalysis With Culture If Indicated - Urine, Clean Catch   Result Value Ref Range    Specific Gravity, UA 1.012 1.005 - 1.030    pH, UA 7.0 5.0 - 7.5    Color, UA Yellow Yellow    Appearance, UA Clear Clear    Leukocytes, UA Negative Negative    Protein Negative Negative/Trace    Glucose, UA Negative Negative    Ketones Negative Negative    Blood, UA Trace (A) Negative    Bilirubin, UA Negative Negative    Urobilinogen, UA 0.2 0.2 - 1.0 mg/dL    Nitrite, UA Negative Negative    Microscopic Examination See below:     Urinalysis Reflex Comment    Comprehensive Metabolic Panel   Result Value Ref Range    Glucose 94 65 - 99 mg/dL    BUN 15 8 - 23 mg/dL    Creatinine 0.80 0.57 - 1.00 mg/dL    eGFR Non African Am 72 >60 mL/min/1.73    eGFR African Am 87 >60 mL/min/1.73    BUN/Creatinine Ratio 18.8 7.0 - 25.0    Sodium 142 136 - 145 mmol/L    Potassium 4.1 3.5 - 5.2 mmol/L    Chloride 102 98 - 107 mmol/L    Total CO2 30.2 (H) 22.0 - 29.0 mmol/L    Calcium 9.5 8.8 - 10.5 mg/dL    Total Protein 6.9 6.0 - 8.5 g/dL    Albumin 4.60 3.50 - 5.20 g/dL    Globulin 2.3 gm/dL    A/G Ratio 2.0 g/dL    Total Bilirubin 0.4 0.2 - 1.2 mg/dL    Alkaline Phosphatase 52 40 - 129 U/L    AST (SGOT) 17 5 - 32 U/L    ALT (SGPT) 12 5 - 33 U/L   Hemoglobin A1c   Result Value Ref Range    Hemoglobin A1C 5.20 4.80 - 5.60 %   Microscopic Examination -   Result Value Ref Range    WBC, UA 0-5 0 - 5 /hpf    RBC, UA 0-2 0 - 2 /hpf    Epithelial Cells (non renal) None seen 0 - 10 /hpf    Mucus, UA Present Not Estab. /hpf    Bacteria, UA Few None seen/Few /hpf   CBC & Differential   Result Value Ref Range    WBC 4.06 3.40 - 10.80 10*3/mm3    RBC 4.02 3.77 - 5.28 10*6/mm3    Hemoglobin 12.9 12.0 - 15.9 g/dL    Hematocrit 38.8 34.0 - 46.6 %    MCV 96.5 79.0 - 97.0 fL    MCH 32.1 26.6 - 33.0 pg    MCHC 33.2 31.5 - 35.7 g/dL    RDW 12.0 (L) 12.3 - 15.4 %    Platelets 286 140 - 450 10*3/mm3    Neutrophil Rel % 47.1  42.7 - 76.0 %    Lymphocyte Rel % 35.2 19.6 - 45.3 %    Monocyte Rel % 14.8 (H) 5.0 - 12.0 %    Eosinophil Rel % 2.2 0.3 - 6.2 %    Basophil Rel % 0.5 0.0 - 1.5 %    Neutrophils Absolute 1.91 1.40 - 7.00 10*3/mm3    Lymphocytes Absolute 1.43 0.70 - 3.10 10*3/mm3    Monocytes Absolute 0.60 0.10 - 0.90 10*3/mm3    Eosinophils Absolute 0.09 0.00 - 0.40 10*3/mm3    Basophils Absolute 0.02 0.00 - 0.20 10*3/mm3    Immature Granulocyte Rel % 0.2 0.0 - 0.5 %    Immature Grans Absolute 0.01 0.00 - 0.05 10*3/mm3    nRBC 0.0 0.0 - 0.0 /100 WBC           Isabel was seen today for follow-up, vitamin d deficiency and osteoporosis.    Diagnoses and all orders for this visit:    Osteoporosis, unspecified osteoporosis type, unspecified pathological fracture presence    Vitamin D deficiency    Varicose veins of both lower extremities with pain    Other orders  -     Pneumococcal Conjugate Vaccine 13-Valent All        DEXA in 2020 and will stay on Fosamax until that time. Doesn't want to do Prolia at this point which I think is a reasonable choice. Encouraged weight bearing exercises. Stay on Caltrate.     Keep using compression stockings for her legs. Call if become painful and can send to vein clinic.     D was high normal in 2/2019 and will repeat at her next visit. Discussed with her that I will not be here in 2/2020 and Jain will provide information on physicians in area.   Return in about 5 months (around 2/24/2020) for Recheck, Annual physical.    Destiny Locke MD  09/18/2019

## 2020-01-27 DIAGNOSIS — M81.0 OSTEOPOROSIS: ICD-10-CM

## 2020-01-27 RX ORDER — ALENDRONATE SODIUM 70 MG/1
TABLET ORAL
Qty: 12 TABLET | Refills: 0 | OUTPATIENT
Start: 2020-01-27

## 2020-01-29 ENCOUNTER — TELEPHONE (OUTPATIENT)
Dept: INTERNAL MEDICINE | Facility: CLINIC | Age: 70
End: 2020-01-29

## 2020-01-29 DIAGNOSIS — M81.0 OSTEOPOROSIS: ICD-10-CM

## 2020-01-29 RX ORDER — ALENDRONATE SODIUM 70 MG/1
70 TABLET ORAL WEEKLY
Qty: 2 TABLET | Refills: 0 | Status: SHIPPED | OUTPATIENT
Start: 2020-01-29

## 2020-01-29 NOTE — TELEPHONE ENCOUNTER
PATIENT CALLED NEEDING 2 TABLETS OF ALENDRONATE 70 MG. HER NEW PCP WILL NOT REFILL UNTIL SHE IS SEEN IN 2 WEEKS.    PLEASE INFORM PATIENT.

## 2020-02-10 ENCOUNTER — TRANSCRIBE ORDERS (OUTPATIENT)
Dept: ADMINISTRATIVE | Facility: HOSPITAL | Age: 70
End: 2020-02-10

## 2020-02-10 DIAGNOSIS — Z12.31 OTHER SCREENING MAMMOGRAM: Primary | ICD-10-CM

## 2020-03-12 ENCOUNTER — HOSPITAL ENCOUNTER (OUTPATIENT)
Dept: MAMMOGRAPHY | Facility: HOSPITAL | Age: 70
Discharge: HOME | End: 2020-03-12
Admitting: FAMILY MEDICINE

## 2020-03-12 DIAGNOSIS — Z12.31 OTHER SCREENING MAMMOGRAM: ICD-10-CM

## 2020-03-12 PROCEDURE — 77063 BREAST TOMOSYNTHESIS BI: CPT

## 2020-03-12 PROCEDURE — 77067 SCR MAMMO BI INCL CAD: CPT

## 2021-03-16 ENCOUNTER — TRANSCRIBE ORDERS (OUTPATIENT)
Dept: ADMINISTRATIVE | Facility: HOSPITAL | Age: 71
End: 2021-03-16

## 2021-03-16 DIAGNOSIS — Z12.31 SCREENING MAMMOGRAM, ENCOUNTER FOR: Primary | ICD-10-CM

## 2021-04-12 ENCOUNTER — HOSPITAL ENCOUNTER (OUTPATIENT)
Dept: MAMMOGRAPHY | Facility: HOSPITAL | Age: 71
Discharge: HOME | End: 2021-04-12
Admitting: FAMILY MEDICINE

## 2021-04-12 DIAGNOSIS — Z12.31 SCREENING MAMMOGRAM, ENCOUNTER FOR: ICD-10-CM

## 2021-04-12 PROCEDURE — 77067 SCR MAMMO BI INCL CAD: CPT

## 2021-04-12 PROCEDURE — 77063 BREAST TOMOSYNTHESIS BI: CPT

## 2022-03-01 ENCOUNTER — TRANSCRIBE ORDERS (OUTPATIENT)
Dept: ADMINISTRATIVE | Facility: HOSPITAL | Age: 72
End: 2022-03-01

## 2022-03-01 DIAGNOSIS — Z78.0 MENOPAUSE: ICD-10-CM

## 2022-03-01 DIAGNOSIS — Z12.31 SCREENING MAMMOGRAM, ENCOUNTER FOR: Primary | ICD-10-CM

## 2022-04-15 ENCOUNTER — HOSPITAL ENCOUNTER (OUTPATIENT)
Dept: BONE DENSITY | Facility: HOSPITAL | Age: 72
Discharge: HOME | End: 2022-04-15

## 2022-04-15 ENCOUNTER — HOSPITAL ENCOUNTER (OUTPATIENT)
Dept: MAMMOGRAPHY | Facility: HOSPITAL | Age: 72
Discharge: HOME | End: 2022-04-15

## 2022-04-15 DIAGNOSIS — Z78.0 MENOPAUSE: ICD-10-CM

## 2022-04-15 DIAGNOSIS — Z12.31 SCREENING MAMMOGRAM, ENCOUNTER FOR: ICD-10-CM

## 2022-04-15 PROCEDURE — 77063 BREAST TOMOSYNTHESIS BI: CPT

## 2022-04-15 PROCEDURE — 77067 SCR MAMMO BI INCL CAD: CPT

## 2022-04-15 PROCEDURE — 77080 DXA BONE DENSITY AXIAL: CPT

## 2023-03-06 ENCOUNTER — TRANSCRIBE ORDERS (OUTPATIENT)
Dept: ADMINISTRATIVE | Facility: HOSPITAL | Age: 73
End: 2023-03-06
Payer: MEDICARE

## 2023-03-06 DIAGNOSIS — Z12.31 BREAST CANCER SCREENING BY MAMMOGRAM: Primary | ICD-10-CM

## 2023-04-18 ENCOUNTER — HOSPITAL ENCOUNTER (OUTPATIENT)
Dept: MAMMOGRAPHY | Facility: HOSPITAL | Age: 73
Discharge: HOME | End: 2023-04-18
Admitting: FAMILY MEDICINE
Payer: MEDICARE

## 2023-04-18 DIAGNOSIS — Z12.31 BREAST CANCER SCREENING BY MAMMOGRAM: ICD-10-CM

## 2023-04-18 PROCEDURE — 77063 BREAST TOMOSYNTHESIS BI: CPT

## 2023-04-18 PROCEDURE — 77067 SCR MAMMO BI INCL CAD: CPT

## 2024-01-05 ENCOUNTER — TELEPHONE (OUTPATIENT)
Dept: GASTROENTEROLOGY | Facility: CLINIC | Age: 74
End: 2024-01-05
Payer: MEDICARE

## 2024-01-05 DIAGNOSIS — Z86.010 PERSONAL HISTORY OF COLONIC POLYPS: Primary | ICD-10-CM

## 2024-01-05 NOTE — TELEPHONE ENCOUNTER
FAST TRACK - 5 YEAR RECALL 02/2024  LAST COLONOSCOPY 02/04/2019  PERSONAL HISTORY POLYPS  FAMILY HISTORY CRC, PATERNAL GRAND FATHER AGE 65  FAMILY HISTORY POLYPS, MOTHER AGE 80  SCHEDULE AT [facility].

## 2024-01-08 PROBLEM — Z86.010 PERSONAL HISTORY OF COLONIC POLYPS: Status: ACTIVE | Noted: 2024-01-05

## 2024-01-08 PROBLEM — Z86.0100 PERSONAL HISTORY OF COLONIC POLYPS: Status: ACTIVE | Noted: 2024-01-05

## 2024-01-08 NOTE — TELEPHONE ENCOUNTER
Spoke with patient.  Scheduled at [facility] 03/05/2024 at 1:30pm - arrive 12:30pm.  Will mail instructions.

## 2024-03-05 ENCOUNTER — HOSPITAL ENCOUNTER (OUTPATIENT)
Facility: SURGERY CENTER | Age: 74
Setting detail: HOSPITAL OUTPATIENT SURGERY
Discharge: HOME | End: 2024-03-05
Attending: INTERNAL MEDICINE | Admitting: INTERNAL MEDICINE
Payer: MEDICARE

## 2024-03-05 ENCOUNTER — ANESTHESIA EVENT (OUTPATIENT)
Dept: SURGERY | Facility: SURGERY CENTER | Age: 74
End: 2024-03-05
Payer: MEDICARE

## 2024-03-05 ENCOUNTER — ANESTHESIA (OUTPATIENT)
Dept: SURGERY | Facility: SURGERY CENTER | Age: 74
End: 2024-03-05
Payer: MEDICARE

## 2024-03-05 VITALS
BODY MASS INDEX: 24.59 KG/M2 | HEIGHT: 65 IN | OXYGEN SATURATION: 96 % | RESPIRATION RATE: 16 BRPM | DIASTOLIC BLOOD PRESSURE: 82 MMHG | TEMPERATURE: 98.2 F | SYSTOLIC BLOOD PRESSURE: 125 MMHG | WEIGHT: 147.6 LBS | HEART RATE: 72 BPM

## 2024-03-05 PROCEDURE — 25010000002 GLYCOPYRROLATE PF 0.2 MG/ML SOLUTION: Performed by: ANESTHESIOLOGY

## 2024-03-05 PROCEDURE — 25010000002 PROPOFOL 200 MG/20ML EMULSION: Performed by: ANESTHESIOLOGY

## 2024-03-05 PROCEDURE — 25010000002 PROPOFOL 1000 MG/100ML EMULSION: Performed by: ANESTHESIOLOGY

## 2024-03-05 PROCEDURE — 25810000003 LACTATED RINGERS PER 1000 ML: Performed by: INTERNAL MEDICINE

## 2024-03-05 PROCEDURE — G0105 COLORECTAL SCRN; HI RISK IND: HCPCS | Performed by: INTERNAL MEDICINE

## 2024-03-05 RX ORDER — SODIUM CHLORIDE 0.9 % (FLUSH) 0.9 %
10 SYRINGE (ML) INJECTION AS NEEDED
Status: DISCONTINUED | OUTPATIENT
Start: 2024-03-05 | End: 2024-03-05 | Stop reason: HOSPADM

## 2024-03-05 RX ORDER — LIDOCAINE HYDROCHLORIDE 10 MG/ML
0.5 INJECTION, SOLUTION INFILTRATION; PERINEURAL ONCE AS NEEDED
Status: DISCONTINUED | OUTPATIENT
Start: 2024-03-05 | End: 2024-03-05 | Stop reason: HOSPADM

## 2024-03-05 RX ORDER — SODIUM CHLORIDE, SODIUM LACTATE, POTASSIUM CHLORIDE, CALCIUM CHLORIDE 600; 310; 30; 20 MG/100ML; MG/100ML; MG/100ML; MG/100ML
1000 INJECTION, SOLUTION INTRAVENOUS CONTINUOUS
Status: DISCONTINUED | OUTPATIENT
Start: 2024-03-05 | End: 2024-03-05 | Stop reason: HOSPADM

## 2024-03-05 RX ORDER — PROPOFOL 10 MG/ML
INJECTION, EMULSION INTRAVENOUS AS NEEDED
Status: DISCONTINUED | OUTPATIENT
Start: 2024-03-05 | End: 2024-03-05 | Stop reason: SURG

## 2024-03-05 RX ORDER — PROPOFOL 10 MG/ML
INJECTION, EMULSION INTRAVENOUS CONTINUOUS PRN
Status: DISCONTINUED | OUTPATIENT
Start: 2024-03-05 | End: 2024-03-05 | Stop reason: SURG

## 2024-03-05 RX ORDER — LIDOCAINE HYDROCHLORIDE 20 MG/ML
INJECTION, SOLUTION INFILTRATION; PERINEURAL AS NEEDED
Status: DISCONTINUED | OUTPATIENT
Start: 2024-03-05 | End: 2024-03-05 | Stop reason: SURG

## 2024-03-05 RX ADMIN — GLYCOPYRROLATE 0.2 MCG: 0.2 INJECTION, SOLUTION INTRAMUSCULAR; INTRAVITREAL at 14:00

## 2024-03-05 RX ADMIN — PROPOFOL 160 MCG/KG/MIN: 10 INJECTION, EMULSION INTRAVENOUS at 14:00

## 2024-03-05 RX ADMIN — SODIUM CHLORIDE, POTASSIUM CHLORIDE, SODIUM LACTATE AND CALCIUM CHLORIDE 1000 ML: 600; 310; 30; 20 INJECTION, SOLUTION INTRAVENOUS at 12:50

## 2024-03-05 RX ADMIN — LIDOCAINE HYDROCHLORIDE 60 MG: 20 INJECTION, SOLUTION INFILTRATION; PERINEURAL at 14:00

## 2024-03-05 RX ADMIN — PROPOFOL 120 MG: 10 INJECTION, EMULSION INTRAVENOUS at 14:00

## 2024-03-05 RX ADMIN — SODIUM CHLORIDE, POTASSIUM CHLORIDE, SODIUM LACTATE AND CALCIUM CHLORIDE: 600; 310; 30; 20 INJECTION, SOLUTION INTRAVENOUS at 14:00

## 2024-03-05 NOTE — BRIEF OP NOTE
COLONOSCOPY  Progress Note    Isabel Pizarro  3/5/2024    Pre-op Diagnosis:   Personal history of colonic polyps [Z86.010]       Post-Op Diagnosis Codes:     * Personal history of colonic polyps [Z86.010]    Procedure/CPT® Codes:        Procedure(s):  COLONOSCOPY              Surgeon(s):  Juanjo Garcia MD    Anesthesia: Monitored Anesthesia Care    Staff:   Endo Technician: Naomie Dodson RN  Endo Nurse: Laura Medley RN         Estimated Blood Loss: none    Urine Voided: * No values recorded between 3/5/2024  1:55 PM and 3/5/2024  2:19 PM *    Specimens:                None          Drains: * No LDAs found *    Findings: Colon to TI good Prep  Normal Mucosa thru-out        Complications: None          Juanjo Garcia MD     Date: 3/5/2024  Time: 14:20 EST

## 2024-03-05 NOTE — ANESTHESIA POSTPROCEDURE EVALUATION
Patient: Isabel Pizarro    Procedure Summary       Date: 03/05/24 Room / Location: [facility] / [facility]    Anesthesia Start: 1355 Anesthesia Stop: 1423    Procedure: COLONOSCOPY Diagnosis:       Personal history of colonic polyps      (Personal history of colonic polyps [Z86.010])    Surgeons: Juanjo Garcia MD Provider: Benoit Castillo MD    Anesthesia Type: MAC ASA Status: 1            Anesthesia Type: MAC    Vitals  Vitals Value Taken Time   /82 03/05/24 1448   Temp 36.8 °C (98.2 °F) 03/05/24 1423   Pulse 72 03/05/24 1448   Resp 16 03/05/24 1448   SpO2 96 % 03/05/24 1448           Post Anesthesia Care and Evaluation    Level of consciousness: awake  Pain management: satisfactory to patient    Airway patency: patent  Anesthetic complications: No anesthetic complications  PONV Status: controlled  Cardiovascular status: acceptable  Respiratory status: acceptable  Hydration status: acceptable    Comments: I did not personally evaluate the patient in PACU. I was not notified of any anesthesia complications. The patient appears to have had an uneventful PACU course per chart review.

## 2024-03-05 NOTE — ANESTHESIA PREPROCEDURE EVALUATION
" Anesthesia Evaluation     Patient summary reviewed and Nursing notes reviewed   no history of anesthetic complications:   NPO Solid Status: > 8 hours  NPO Liquid Status: > 2 hours           Airway   Mallampati: II  No difficulty expected  Dental      Pulmonary - normal exam   (-) COPD, asthma  Cardiovascular   Exercise tolerance: good (4-7 METS)    Rhythm: regular    (-) past MI, angina, cardiac stents      Neuro/Psych  (-) seizures, CVA  GI/Hepatic/Renal/Endo    (-) GERD, liver disease, no renal disease    Musculoskeletal     Abdominal    Substance History - negative use     OB/GYN          Other - negative ROS                         Anesthesia Plan    ASA 1     MAC     (I have reviewed the patient's history and chart with the patient, including all pertinent laboratory results and imaging. I have explained the risks of monitored anesthesia care including but not limited to respiratory depression, possible need for airway intervention, or possible intra-op recall. I explained that airway intervention involves risk of possible dental injury.    VITALS:  /76 (BP Location: Left arm, Patient Position: Sitting)   Pulse 75   Temp 36.3 °C (97.4 °F) (Temporal)   Resp 16   Ht 165.1 cm (65\")   Wt 67 kg (147 lb 9.6 oz)   SpO2 99%   BMI 24.56 kg/m² )  intravenous induction     Anesthetic plan, risks, benefits, and alternatives have been provided, discussed and informed consent has been obtained with: patient.  Pre-procedure education provided      CODE STATUS:         "

## 2024-03-05 NOTE — H&P
Patient Care Team:  Parviz Callejas MD as PCP - General (Family Medicine)    CHIEF COMPLAINT: Personal hx colon polyps and Family hx of CRC or polyps    HISTORY OF PRESENT ILLNESS:  Last exam was 2019    Past Medical History:   Diagnosis Date    Colon polyp     Dietary counseling 06/21/2016    Calcium and vitamin D    Osteoporosis 12/21/2016    Routine health maintenance 02/16/2018    Shingles rash 12/21/2016    Varicose veins 06/21/2016    Vitamin D deficiency 06/21/2017    White blood cell abnormality 06/21/2017     Past Surgical History:   Procedure Laterality Date    BREAST BIOPSY Left     fibroid tumor    COLONOSCOPY  2018    SUBTOTAL HYSTERECTOMY      TUMOR REMOVAL Left     fibroid - breast     Family History   Problem Relation Age of Onset    Dementia Mother         Alzheimers disease    Prostate cancer Father     Breast cancer Paternal Aunt     Cancer Paternal Grandmother 70        colon cancer    Colon cancer Paternal Grandfather      Social History     Tobacco Use    Smoking status: Never    Smokeless tobacco: Never   Vaping Use    Vaping status: Never Used   Substance Use Topics    Alcohol use: Yes     Comment: rare socially    Drug use: No     Medications Prior to Admission   Medication Sig Dispense Refill Last Dose    B Complex Vitamins (VITAMIN B COMPLEX) capsule capsule Take  by mouth Daily.   Past Week    Biotin 5000 MCG capsule Take  by mouth.   Past Week    Calcium Carbonate-Vit D-Min (CALTRATE 600+D PLUS MINERALS) 600-800 MG-UNIT chewable tablet Chew.   Past Week    Cyanocobalamin (VITAMIN B-12 PO) Take 3,000 mcg by mouth.   Past Week    Omega-3 Fatty Acids (FISH OIL) 1000 MG capsule capsule Take  by mouth Daily With Breakfast.   Past Week    denosumab (PROLIA) 60 MG/ML solution prefilled syringe syringe Inject 1 mL under the skin into the appropriate area as directed 1 (One) Time. Iv infusion   More than a month     Allergies:  Patient has no known allergies.    REVIEW OF SYSTEMS:  Please see the  "above history of present illness for pertinent positives and negatives.  The remainder of the patient's systems have been reviewed and are negative.     Vital Signs  Temp:  [97.4 °F (36.3 °C)] 97.4 °F (36.3 °C)  Heart Rate:  [75] 75  Resp:  [16] 16  BP: (150)/(76) 150/76    Flowsheet Rows      Flowsheet Row First Filed Value   Admission Height 165.1 cm (65\") Documented at 03/05/2024 1244   Admission Weight 67 kg (147 lb 9.6 oz) Documented at 03/05/2024 1244             Physical Exam:  Physical Exam   Constitutional: Patient appears well-developed and well-nourished and in no acute distress   HEENT:   Head: Normocephalic and atraumatic.   Eyes:  Pupils are equal, round, and reactive to light. EOM are intact. Sclerae are anicteric and non-injected.  Mouth and Throat: Patient has moist mucous membranes. Oropharynx is clear of any erythema or exudate.     Neck: Neck supple. No JVD present. No thyromegaly present. No lymphadenopathy present.  Cardiovascular: Regular rate, regular rhythm, S1 normal and S2 normal.  Exam reveals no gallop and no friction rub.  No murmur heard.  Pulmonary/Chest: Lungs are clear to auscultation bilaterally. No respiratory distress. No wheezes. No rhonchi. No rales.   Abdominal: Soft. Bowel sounds are normal. No distension and no mass. There is no hepatosplenomegaly. There is no tenderness.   Musculoskeletal: Normal Muscle tone  Extremities: No edema. Pulses are palpable in all 4 extremities.  Neurological: Patient is alert and oriented to person, place, and time. Cranial nerves II-XII are grossly intact with no focal deficits.  Skin: Skin is warm. No rash noted. Nails show no clubbing.  No cyanosis or erythema.    Debilities/Disabilities Identified: None  Emotional Behavior: Appropriate     Results Review:   I reviewed the patient's new clinical results.    Lab Results (most recent)       None            Imaging Results (Most Recent)       None          reviewed    ECG/EMG Results (most " recent)       None          reviewed    Assessment & Plan   Personal hx colon polyps and Family hx of CRC or polyps/  colonoscopy      I discussed the patient's findings and my recommendations with patient.     Juanjo Garcia MD  03/05/24  13:20 EST    Time: 10 min prior to procedure.

## 2024-03-07 ENCOUNTER — TRANSCRIBE ORDERS (OUTPATIENT)
Dept: ADMINISTRATIVE | Facility: HOSPITAL | Age: 74
End: 2024-03-07
Payer: MEDICARE

## 2024-03-07 DIAGNOSIS — Z12.31 VISIT FOR SCREENING MAMMOGRAM: Primary | ICD-10-CM

## 2024-04-19 ENCOUNTER — HOSPITAL ENCOUNTER (OUTPATIENT)
Dept: MAMMOGRAPHY | Facility: HOSPITAL | Age: 74
Discharge: HOME | End: 2024-04-19
Admitting: FAMILY MEDICINE
Payer: MEDICARE

## 2024-04-19 DIAGNOSIS — Z12.31 VISIT FOR SCREENING MAMMOGRAM: ICD-10-CM

## 2024-04-19 PROCEDURE — 77067 SCR MAMMO BI INCL CAD: CPT

## 2024-04-19 PROCEDURE — 77063 BREAST TOMOSYNTHESIS BI: CPT

## 2025-03-13 ENCOUNTER — TRANSCRIBE ORDERS (OUTPATIENT)
Dept: MAMMOGRAPHY | Facility: HOSPITAL | Age: 75
End: 2025-03-13
Payer: MEDICARE

## 2025-03-13 DIAGNOSIS — Z12.31 ENCOUNTER FOR SCREENING MAMMOGRAM FOR BREAST CANCER: Primary | ICD-10-CM

## 2025-04-22 ENCOUNTER — HOSPITAL ENCOUNTER (OUTPATIENT)
Dept: MAMMOGRAPHY | Facility: HOSPITAL | Age: 75
Discharge: HOME | End: 2025-04-22
Admitting: FAMILY MEDICINE
Payer: MEDICARE

## 2025-04-22 DIAGNOSIS — Z12.31 ENCOUNTER FOR SCREENING MAMMOGRAM FOR BREAST CANCER: ICD-10-CM

## 2025-04-22 PROCEDURE — 77067 SCR MAMMO BI INCL CAD: CPT

## 2025-04-22 PROCEDURE — 77067 SCR MAMMO BI INCL CAD: CPT | Performed by: RADIOLOGY

## 2025-04-22 PROCEDURE — 77063 BREAST TOMOSYNTHESIS BI: CPT

## 2025-04-22 PROCEDURE — 77063 BREAST TOMOSYNTHESIS BI: CPT | Performed by: RADIOLOGY

## (undated) DEVICE — ADAPT CLN SCPE ENDO PORPOISE BX/50 DISP

## (undated) DEVICE — Device

## (undated) DEVICE — CANN O2 ETCO2 FITS ALL CONN CO2 SMPL A/ 7IN DISP LF

## (undated) DEVICE — JACKT LAB F/R KNIT CUFF/COLR XLG BLU

## (undated) DEVICE — KT ORCA ORCAPOD DISP STRL

## (undated) DEVICE — FLEX ADVANTAGE 1500CC: Brand: FLEX ADVANTAGE